# Patient Record
Sex: FEMALE | Race: OTHER | NOT HISPANIC OR LATINO | URBAN - METROPOLITAN AREA
[De-identification: names, ages, dates, MRNs, and addresses within clinical notes are randomized per-mention and may not be internally consistent; named-entity substitution may affect disease eponyms.]

---

## 2023-03-22 ENCOUNTER — EMERGENCY (EMERGENCY)
Facility: HOSPITAL | Age: 28
LOS: 1 days | Discharge: HOME | End: 2023-03-22
Admitting: EMERGENCY MEDICINE
Payer: COMMERCIAL

## 2023-03-22 VITALS
OXYGEN SATURATION: 100 % | TEMPERATURE: 98 F | RESPIRATION RATE: 16 BRPM | HEIGHT: 71 IN | WEIGHT: 117.95 LBS | SYSTOLIC BLOOD PRESSURE: 123 MMHG | HEART RATE: 74 BPM | DIASTOLIC BLOOD PRESSURE: 78 MMHG

## 2023-03-22 PROCEDURE — 99284 EMERGENCY DEPT VISIT MOD MDM: CPT

## 2023-03-22 PROCEDURE — 73610 X-RAY EXAM OF ANKLE: CPT | Mod: 26,LT

## 2023-03-22 NOTE — ED PROVIDER NOTE - NSICDXNOPASTMEDICALHX_GEN_ALL_ED
"I have reviewed the surgical (or preoperative) H&P that is linked to this encounter, and examined the patient. There are no significant changes    Clinical Conditions Present on Arrival:  Clinically Significant Risk Factors Present on Admission                   # Overweight: Estimated body mass index is 26.15 kg/m  as calculated from the following:    Height as of 6/3/22: 1.676 m (5' 6\").    Weight as of 6/14/22: 73.5 kg (162 lb).       " <-- Click to add NO pertinent Past Medical History

## 2023-03-22 NOTE — ED PROVIDER NOTE - CARE PROVIDER_API CALL
Joseph Jules)  Orthopaedic Surgery  [address]  [place], [place] [address]  Phone: [phone]  Fax: [phone]  Follow Up Time:

## 2023-03-22 NOTE — ED PROVIDER NOTE - CLINICAL SUMMARY MEDICAL DECISION MAKING FREE TEXT BOX
pt presents with c/o left lateral ankle pain after twisting it 10 days ago while skiing, worsening again over the last 2-3 days. taking ibuprofen and using RICE without relief. will obtain xray to r/o fracture. pt presents with c/o left lateral ankle pain after twisting it 10 days ago while skiing, worsening again over the last 2-3 days. taking ibuprofen and using RICE without relief. will obtain xray to r/o fracture.    xray negative for fracture. given air cast for support. advised f/u with ortho next week if not significantly improved.

## 2023-03-22 NOTE — ED PROVIDER NOTE - OBJECTIVE STATEMENT
28yo F presents with c/o left ankle pain onset 10 days ago while snowboarding in [facility]. states she rolled her ankle but was able to continue snow boarding that day and the next. she was using RICE with some improvement but states that after going back to work over the last 2-3 days and the pain started to intensify. she went to  and was referred to the ED for Xray. has been taking ibuprofen without relief.

## 2023-03-22 NOTE — ED PROVIDER NOTE - PATIENT PORTAL LINK FT
You can access the FollowMyHealth Patient Portal offered by [facility] by registering at the following website: [email or web address] By joining [facility]’s FollowMyHealth portal, you will also be able to view your health information using other applications (apps) compatible with our system.

## 2023-03-22 NOTE — ED ADULT NURSE NOTE - OBJECTIVE STATEMENT
Pt is in no acute distress. Vital signs are stable. Pt has a normal range of motion. Pt seen and examined by provider. X-ray completed. Safety precautions are maintained. Will continue to provide care.

## 2023-03-22 NOTE — ED PROVIDER NOTE - CROS ED NEURO ALL NEG
----- Message from Tiny Bolden sent at 1/23/2017  8:09 AM CST -----  Contact: self  Pt cannot make today's appt and would like to reschedule    Contact number [phone]   negative...

## 2023-03-22 NOTE — ED PROVIDER NOTE - PHYSICAL EXAMINATION
Constitutional: Well appearing, awake, alert, oriented to person, place, time/situation and in no apparent distress.  HEENT: Airway patent, NCAT  Resp: no conversational dyspnea, tachypnea, or signs of respiratory distress  Msk: ambulating slight limp, + swelling to lateral aspect of ankle with tenderness to the posterior aspect of the lateral malleolus, bruising noted to 4th and 5th digit but without any tenderness, normal ROM of the foot and ankle but with discomfort with movement of the ankle, 5/5 sensation to extremity and 2+ dp/pt pulses. no other bony tenderness to the foot and ankle.   Neuro: A&Ox3

## 2023-03-24 DIAGNOSIS — S93.402A SPRAIN OF UNSPECIFIED LIGAMENT OF LEFT ANKLE, INITIAL ENCOUNTER: ICD-10-CM
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
## 2023-03-24 DIAGNOSIS — M25.572 PAIN IN LEFT ANKLE AND JOINTS OF LEFT FOOT: ICD-10-CM
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
